# Patient Record
Sex: FEMALE | Race: WHITE | NOT HISPANIC OR LATINO | Employment: STUDENT | URBAN - METROPOLITAN AREA
[De-identification: names, ages, dates, MRNs, and addresses within clinical notes are randomized per-mention and may not be internally consistent; named-entity substitution may affect disease eponyms.]

---
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
## 2025-03-13 ENCOUNTER — TELEPHONE (OUTPATIENT)
Dept: PSYCHIATRY | Facility: CLINIC | Age: 20
End: 2025-03-13

## 2025-03-13 NOTE — TELEPHONE ENCOUNTER
Called pt and left VM regarding scheduling f/u appt with Amalia Ramires.    Gave [facility] phone number to call back and schedule with provider.

## 2025-03-17 ENCOUNTER — TELEPHONE (OUTPATIENT)
Dept: PSYCHIATRY | Facility: CLINIC | Age: 20
End: 2025-03-17

## 2025-03-17 NOTE — TELEPHONE ENCOUNTER
Called pt regarding scheduling f/u appt with provider.    Pt is now scheduled on 3/27 @ 3:00pm with Amalia Ramires

## 2025-03-27 ENCOUNTER — SOCIAL WORK (OUTPATIENT)
Dept: BEHAVIORAL/MENTAL HEALTH CLINIC | Facility: CLINIC | Age: 20
End: 2025-03-27
Payer: COMMERCIAL

## 2025-03-27 DIAGNOSIS — F41.9 ANXIETY: ICD-10-CM

## 2025-03-27 DIAGNOSIS — F43.10 PTSD (POST-TRAUMATIC STRESS DISORDER): Primary | ICD-10-CM

## 2025-03-27 PROCEDURE — 90837 PSYTX W PT 60 MINUTES: CPT | Performed by: SOCIAL WORKER

## 2025-03-27 NOTE — PSYCH
"Behavioral Health Psychotherapy Progress Note    Psychotherapy Provided: Individual Psychotherapy     1. PTSD (post-traumatic stress disorder)        2. Anxiety            Goals addressed in session: Goal 1 and Goal 2     DATA: Therapist met with Amara.  Therapist and client discussed recent life stressors and relationship difficulties.  She discussed difficulty within her relationship and how this has impacted her and her family.  She notes ongoing difficulties within her emotional state as well as what this relationship brings out in her.  Therapist discussed this with her and what she feels that she needs to do and what barriers she feels are in place that keep her from doing those things.  Therapist will continue to support her through balancing life and relationships through this review. Treatment plan and crisis plan updated.   During this session, this clinician used the following therapeutic modalities: Client-centered Therapy, Cognitive Behavioral Therapy, Cognitive Processing Therapy, and Supportive Psychotherapy    Substance Abuse was not addressed during this session. If the client is diagnosed with a co-occurring substance use disorder, please indicate any changes in the frequency or amount of use: na. Stage of change for addressing substance use diagnoses: No substance use/Not applicable    ASSESSMENT:  Amara Salazar presents with a Euthymic/ normal mood.     her affect is Normal range and intensity, which is congruent, with her mood and the content of the session. The client has made progress on their goals.     Amara Salazar presents with a none risk of suicide, none risk of self-harm, and none risk of harm to others.    For any risk assessment that surpasses a \"low\" rating, a safety plan must be developed.    A safety plan was indicated: no  If yes, describe in detail na    PLAN: Between sessions, Amara Salazar will explore barriers. At the next session, the therapist will use Supportive " Psychotherapy to address anxiety.    Behavioral Health Treatment Plan and Discharge Planning: Amara Gomezvy is aware of and agrees to continue to work on their treatment plan. They have identified and are working toward their discharge goals. yes    Depression Follow-up Plan Completed: No    Visit start and stop times:    03/27/25  Start Time: 1508  Stop Time: 1607  Total Visit Time: 59 minutes

## 2025-03-28 NOTE — BH CRISIS PLAN
Client Name: Amara Salazar       Client YOB: 2005    Tere Safety Plan      Creation Date: 3/28/25 Update Date: 3/28/26   Created By: Amalia Ramires Last Updated By: Amalia Ramires      Step 1: Warning Signs:   Warning Signs   Increased anger and irritability   Increased sadness   Increased paranoid thoughts   Increased isolation   Crying spells   Irrational actions and thoughts            Step 2: Internal Coping Strategies:   Internal Coping Strategies   Dancing   Being with her cat   Taking a walk   Taking a drive   Staying in her routine            Step 3: People and social settings that provide distraction:   Name Contact Information   Maria G In her cell phone   [place] [phone]            Step 4: People whom I can ask for help during a crisis:      Name Contact Information    [place] [phone]      Step 5: Professionals or agencies I can contact during a crisis:      Clinican/Agency Name Phone Emergency Contact    [place] [phone]       Local Emergency Department Emergency Department Phone Emergency Department Address    911          Crisis Phone Numbers:   Suicide Prevention Lifeline: Call or Text  198 Crisis Text Line: Text HOME to 259-563   Please note: Some [facility] do not have a separate number for Child/Adolescent specific crisis. If your county is not listed under Child/Adolescent, please call the adult number for your county      Adult Crisis Numbers: Child/Adolescent Crisis Numbers   [facility]: [phone] [facility]: [phone]   [facility]: [phone] [facility]: [phone]   [facility]: [phone] [place], [place]: [phone]   [facility]: [phone] [place]: [phone]   [place]: [phone]   [facility]: [phone]   [facility]: [phone]   [facility]: [phone] (daytime) [phone] (after hours, weekends, holidays)      Step 6: Making the  environment safer (plan for lethal means safety):   Patient did not identify any lethal methods: Yes     Optional: What is most important to me and worth living for?   My future, My siblings     Tere Safety Plan. Katharine Johnson and Ronen Baig. Used with permission of the authors.

## 2025-03-28 NOTE — BH TREATMENT PLAN
Outpatient Behavioral Health Psychotherapy Treatment Plan    Amara Chy  2005     Date of Initial Psychotherapy Assessment:   Date of Current Treatment Plan: 3/27/25  Treatment Plan Target Date: 10/27/25  Treatment Plan Expiration Date: 10/27/25    Diagnosis:   1. PTSD (post-traumatic stress disorder)        2. Anxiety                Area(s) of Need: Support, healthy relationships, family exploration    Long Term Goal 1 (in the client's own words): I want to focus on my future    Stage of Change: Action    Target Date for completion: TBD     Anticipated therapeutic modalities: Supportive therapy, CBT, Gestalt, Client Centered Therapy, Trauma Therapy, Relaxation Skills, Emotional Regulation, future planning      People identified to complete this goal: Amalia Maloney      Objective 1: (identify the means of measuring success in meeting the objective): I will be able to demonstrate an understanding of my feelings and self awareness, as evidenced by emotional regulation and self control at least 50% of my day, as self reported 3 out of 5 days per week..    Objective 2: (identify the means of measuring success in meeting the objective): I will be able to develop a plan for my future that includes at least 3 goals and 3 objectives and begin to work on at least 1 objective during this review..      Long Term Goal 2 (in the client's own words): I will be able to focus on my needs  .  Stage of Change: Action    Target Date for completion: 3/26     Anticipated therapeutic modalities: mindfulness-based  therapy, DBT     People identified to complete this goal: Kristen      Objective 1: (identify the means of measuring success in meeting the objective): I will be able to demonstrate an understanding of what I need to fulfill myself, rather than to satisfy a want at least once during a therapy session.      Objective 2: (identify the means of measuring success in meeting the objective): I will be able to work  towards growing my self esteem through development of self awareness at least 5 times per day at least 3 days per week, as self reported..      I am currently under the care of a [facility] psychiatric provider: no    My [facility] psychiatric provider is:     I am currently taking psychiatric medications: No    I feel that I will be ready for discharge from mental health care when I reach the following (measurable goal/objective): When I am able to feel that I can engage in self sustaining independent life activities without emotional support for a period of 3 months.    For children and adults who have a legal guardian:   Has there been any change to custody orders and/or guardianship status? No. If yes, attach updated documentation.    I have updated my Crisis Plan and have been offered a copy of this plan    Behavioral Health Treatment Plan [facility]: Diagnosis and Treatment Plan explained to Amara Salazar acknowledges an understanding of their diagnosis. Amara Salazar agrees to this treatment plan.    I have been offered a copy of this Treatment Plan. Yes

## 2025-04-01 ENCOUNTER — TELEPHONE (OUTPATIENT)
Dept: PSYCHIATRY | Facility: CLINIC | Age: 20
End: 2025-04-01

## 2025-04-08 ENCOUNTER — SOCIAL WORK (OUTPATIENT)
Dept: BEHAVIORAL/MENTAL HEALTH CLINIC | Facility: CLINIC | Age: 20
End: 2025-04-08
Payer: COMMERCIAL

## 2025-04-08 DIAGNOSIS — F43.10 PTSD (POST-TRAUMATIC STRESS DISORDER): Primary | ICD-10-CM

## 2025-04-08 PROCEDURE — 90834 PSYTX W PT 45 MINUTES: CPT | Performed by: SOCIAL WORKER
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
## 2025-04-10 ENCOUNTER — TELEPHONE (OUTPATIENT)
Dept: PSYCHIATRY | Facility: CLINIC | Age: 20
End: 2025-04-10

## 2025-04-10 NOTE — TELEPHONE ENCOUNTER
Left voicemail informing patient and/or parent/guardian of the Psych Encounter form needing to be signed as a requirement from the insurance company for billing purposes. Patient can access form via [employer] and sign electronically.     Please make patient aware this form must be signed for each visit as a requirement to continue future visits with provider.

## 2025-07-15 ENCOUNTER — SOCIAL WORK (OUTPATIENT)
Dept: BEHAVIORAL/MENTAL HEALTH CLINIC | Facility: CLINIC | Age: 20
End: 2025-07-15
Payer: COMMERCIAL

## 2025-07-15 DIAGNOSIS — F41.9 ANXIETY: ICD-10-CM

## 2025-07-15 DIAGNOSIS — F43.10 PTSD (POST-TRAUMATIC STRESS DISORDER): Primary | ICD-10-CM

## 2025-07-15 PROCEDURE — 90837 PSYTX W PT 60 MINUTES: CPT | Performed by: SOCIAL WORKER

## 2025-07-29 ENCOUNTER — SOCIAL WORK (OUTPATIENT)
Dept: BEHAVIORAL/MENTAL HEALTH CLINIC | Facility: CLINIC | Age: 20
End: 2025-07-29
Payer: COMMERCIAL

## 2025-07-29 DIAGNOSIS — F43.10 PTSD (POST-TRAUMATIC STRESS DISORDER): Primary | ICD-10-CM

## 2025-07-29 PROCEDURE — 90834 PSYTX W PT 45 MINUTES: CPT | Performed by: SOCIAL WORKER

## 2025-08-01 ENCOUNTER — TELEPHONE (OUTPATIENT)
Dept: PSYCHIATRY | Facility: CLINIC | Age: 20
End: 2025-08-01

## 2025-08-12 ENCOUNTER — SOCIAL WORK (OUTPATIENT)
Dept: BEHAVIORAL/MENTAL HEALTH CLINIC | Facility: CLINIC | Age: 20
End: 2025-08-12
Payer: COMMERCIAL

## 2025-08-22 ENCOUNTER — TELEPHONE (OUTPATIENT)
Dept: BEHAVIORAL/MENTAL HEALTH CLINIC | Facility: CLINIC | Age: 20
End: 2025-08-22